# Patient Record
Sex: MALE | Race: WHITE | NOT HISPANIC OR LATINO | Employment: FULL TIME | URBAN - METROPOLITAN AREA
[De-identification: names, ages, dates, MRNs, and addresses within clinical notes are randomized per-mention and may not be internally consistent; named-entity substitution may affect disease eponyms.]

---

## 2017-02-23 ENCOUNTER — ALLSCRIPTS OFFICE VISIT (OUTPATIENT)
Dept: OTHER | Facility: OTHER | Age: 27
End: 2017-02-23

## 2018-01-14 VITALS
DIASTOLIC BLOOD PRESSURE: 72 MMHG | TEMPERATURE: 98.4 F | WEIGHT: 145 LBS | OXYGEN SATURATION: 98 % | SYSTOLIC BLOOD PRESSURE: 112 MMHG | HEIGHT: 70 IN | BODY MASS INDEX: 20.76 KG/M2 | HEART RATE: 72 BPM

## 2018-07-21 PROBLEM — B35.4 TINEA CORPORIS: Status: ACTIVE | Noted: 2017-02-23

## 2018-07-25 ENCOUNTER — OFFICE VISIT (OUTPATIENT)
Dept: INTERNAL MEDICINE CLINIC | Facility: CLINIC | Age: 28
End: 2018-07-25

## 2018-07-25 VITALS
TEMPERATURE: 98.2 F | HEART RATE: 54 BPM | DIASTOLIC BLOOD PRESSURE: 76 MMHG | OXYGEN SATURATION: 98 % | HEIGHT: 68 IN | BODY MASS INDEX: 24.07 KG/M2 | WEIGHT: 158.8 LBS | SYSTOLIC BLOOD PRESSURE: 110 MMHG | RESPIRATION RATE: 16 BRPM

## 2018-07-25 DIAGNOSIS — L72.9 SUBCUTANEOUS CYST: ICD-10-CM

## 2018-07-25 DIAGNOSIS — R21 RASH: ICD-10-CM

## 2018-07-25 DIAGNOSIS — Z00.00 HEALTH MAINTENANCE EXAMINATION: Primary | ICD-10-CM

## 2018-07-25 DIAGNOSIS — G43.109 MIGRAINE WITH AURA AND WITHOUT STATUS MIGRAINOSUS, NOT INTRACTABLE: ICD-10-CM

## 2018-07-25 DIAGNOSIS — J30.1 SEASONAL ALLERGIC RHINITIS DUE TO POLLEN: ICD-10-CM

## 2018-07-25 PROBLEM — B35.4 TINEA CORPORIS: Status: RESOLVED | Noted: 2017-02-23 | Resolved: 2018-07-25

## 2018-07-25 PROCEDURE — 99395 PREV VISIT EST AGE 18-39: CPT | Performed by: INTERNAL MEDICINE

## 2018-07-25 NOTE — ASSESSMENT & PLAN NOTE
Instructed to apply warm moist heat on the area, avoid manipulation      Refer to surgery as requested for removal

## 2018-07-25 NOTE — PROGRESS NOTES
Assessment/Plan:    Subcutaneous cyst  Instructed to apply warm moist heat on the area, avoid manipulation  Refer to surgery as requested for removal     Seasonal allergic rhinitis due to pollen  Mild, takes NSAIDs as needed  Migraine with aura and without status migrainosus, not intractable  Occurs a few times a year only, rarely takes medication for it  Diagnoses and all orders for this visit:    Health maintenance examination  Comments:  Vaccinations updated  Declined routine lab work  Rash  Comments:  Apply hydrocortisone daily for a week  If no improvement, start anti fungal cream bid until rash completely gone  Subcutaneous cyst  -     Ambulatory referral to General Surgery; Future    Migraine with aura and without status migrainosus, not intractable    Seasonal allergic rhinitis due to pollen      Follow up as needed  Subjective:      Patient ID: Ramon Cobian is a 28 y o  male  Behzad complains of a rash on his left shoulder  He first noticed this about 3 months ago, area becomes irritated easily  He denies any bleeding or discharge from it  He picks at it frequently, because it gets irritated did while he is working  Denies any trauma  He also complains of a rash on his right thigh, area is slightly red but not itchy  He has not applied any cream or ointments for the rashes  He experiences seasonal allergies, would take an Advil as needed  He would developed mild sinus headaches  He would also experience migraine headaches a few times a year  He rarely takes medication for this, which is sleep in a dark room  The following portions of the patient's history were reviewed and updated as appropriate: allergies, current medications, past family history, past medical history, past social history, past surgical history and problem list     Review of Systems   Constitutional: Negative for appetite change and fatigue     HENT: Negative for congestion, hearing loss and postnasal drip  Eyes: Negative  Negative for visual disturbance  Respiratory: Negative for cough, chest tightness and shortness of breath  Cardiovascular: Negative for chest pain, palpitations and leg swelling  Gastrointestinal: Negative for abdominal pain and constipation  Genitourinary: Negative for dysuria, frequency and urgency  Musculoskeletal: Negative for arthralgias  Skin: Positive for rash and wound  Neurological: Negative for dizziness, numbness and headaches  Hematological: Does not bruise/bleed easily  Psychiatric/Behavioral: Negative for sleep disturbance  The patient is not nervous/anxious  Objective:      /76   Pulse (!) 54   Temp 98 2 °F (36 8 °C)   Resp 16   Ht 5' 8" (1 727 m)   Wt 72 kg (158 lb 12 8 oz)   SpO2 98%   BMI 24 15 kg/m²          Physical Exam   Constitutional: He is oriented to person, place, and time  He appears well-developed and well-nourished  HENT:   Head: Normocephalic and atraumatic  Right Ear: Tympanic membrane and external ear normal    Left Ear: Tympanic membrane and external ear normal    Nose: Nose normal    Mouth/Throat: Uvula is midline, oropharynx is clear and moist and mucous membranes are normal    Eyes: Conjunctivae are normal  Pupils are equal, round, and reactive to light  Neck: Neck supple  No thyroid mass present  Cardiovascular: Normal rate, regular rhythm and normal heart sounds  No edema  Pulmonary/Chest: Effort normal and breath sounds normal  He has no wheezes  He has no rhonchi  Abdominal: Soft  Bowel sounds are normal  There is no tenderness  No hernia  Musculoskeletal: Normal range of motion  No joint pain or swelling   Lymphadenopathy:     He has no cervical adenopathy  Right: No supraclavicular adenopathy present  Left: No supraclavicular adenopathy present  Neurological: He is alert and oriented to person, place, and time  No cranial nerve deficit  Skin: Skin is warm  Lesion and rash noted  No purpura noted  No erythema  Psychiatric: He has a normal mood and affect  His behavior is normal    Nursing note and vitals reviewed

## 2018-07-25 NOTE — PATIENT INSTRUCTIONS
Apply warm moist heat on left shoulder 5-10 minutes at a time several times a day for the next week  Avoid picking at it  May apply hydrocortisone cream for rash on thigh  If no improvement, start ketoconazole or clotrimazole (anti fungal) cream twice a day until gone

## 2019-04-09 ENCOUNTER — TELEPHONE (OUTPATIENT)
Dept: INTERNAL MEDICINE CLINIC | Facility: CLINIC | Age: 29
End: 2019-04-09

## 2023-12-12 ENCOUNTER — HOSPITAL ENCOUNTER (EMERGENCY)
Facility: HOSPITAL | Age: 33
Discharge: HOME | End: 2023-12-12
Attending: EMERGENCY MEDICINE

## 2023-12-12 VITALS
OXYGEN SATURATION: 98 % | SYSTOLIC BLOOD PRESSURE: 108 MMHG | HEART RATE: 60 BPM | RESPIRATION RATE: 18 BRPM | TEMPERATURE: 98.3 F | DIASTOLIC BLOOD PRESSURE: 60 MMHG

## 2023-12-12 DIAGNOSIS — U07.1 COVID-19: Primary | ICD-10-CM

## 2023-12-12 DIAGNOSIS — R11.2 NAUSEA AND VOMITING: ICD-10-CM

## 2023-12-12 LAB
ALBUMIN SERPL BCP-MCNC: 4.5 G/DL (ref 3.5–5)
ALP SERPL-CCNC: 57 U/L (ref 34–104)
ALT SERPL W P-5'-P-CCNC: 19 U/L (ref 7–52)
ANION GAP SERPL CALCULATED.3IONS-SCNC: 9 MMOL/L
APTT PPP: 28 SECONDS (ref 23–37)
AST SERPL W P-5'-P-CCNC: 20 U/L (ref 13–39)
BASOPHILS # BLD AUTO: 0.05 THOUSANDS/ÂΜL (ref 0–0.1)
BASOPHILS NFR BLD AUTO: 1 % (ref 0–1)
BILIRUB SERPL-MCNC: 0.55 MG/DL (ref 0.2–1)
BUN SERPL-MCNC: 14 MG/DL (ref 5–25)
CALCIUM SERPL-MCNC: 9.3 MG/DL (ref 8.4–10.2)
CHLORIDE SERPL-SCNC: 100 MMOL/L (ref 96–108)
CO2 SERPL-SCNC: 27 MMOL/L (ref 21–32)
CREAT SERPL-MCNC: 1.01 MG/DL (ref 0.6–1.3)
EOSINOPHIL # BLD AUTO: 0 THOUSAND/ÂΜL (ref 0–0.61)
EOSINOPHIL NFR BLD AUTO: 0 % (ref 0–6)
ERYTHROCYTE [DISTWIDTH] IN BLOOD BY AUTOMATED COUNT: 12.3 % (ref 11.6–15.1)
FLUAV RNA RESP QL NAA+PROBE: NEGATIVE
FLUBV RNA RESP QL NAA+PROBE: NEGATIVE
GFR SERPL CREATININE-BSD FRML MDRD: 97 ML/MIN/1.73SQ M
GLUCOSE SERPL-MCNC: 115 MG/DL (ref 65–140)
HCT VFR BLD AUTO: 41.6 % (ref 36.5–49.3)
HGB BLD-MCNC: 14.4 G/DL (ref 12–17)
IMM GRANULOCYTES # BLD AUTO: 0.04 THOUSAND/UL (ref 0–0.2)
IMM GRANULOCYTES NFR BLD AUTO: 1 % (ref 0–2)
INR PPP: 0.99 (ref 0.84–1.19)
LIPASE SERPL-CCNC: <6 U/L (ref 11–82)
LYMPHOCYTES # BLD AUTO: 0.4 THOUSANDS/ÂΜL (ref 0.6–4.47)
LYMPHOCYTES NFR BLD AUTO: 6 % (ref 14–44)
MAGNESIUM SERPL-MCNC: 1.9 MG/DL (ref 1.9–2.7)
MCH RBC QN AUTO: 31.2 PG (ref 26.8–34.3)
MCHC RBC AUTO-ENTMCNC: 34.6 G/DL (ref 31.4–37.4)
MCV RBC AUTO: 90 FL (ref 82–98)
MONOCYTES # BLD AUTO: 0.76 THOUSAND/ÂΜL (ref 0.17–1.22)
MONOCYTES NFR BLD AUTO: 12 % (ref 4–12)
NEUTROPHILS # BLD AUTO: 5.18 THOUSANDS/ÂΜL (ref 1.85–7.62)
NEUTS SEG NFR BLD AUTO: 80 % (ref 43–75)
NRBC BLD AUTO-RTO: 0 /100 WBCS
PLATELET # BLD AUTO: 247 THOUSANDS/UL (ref 149–390)
PMV BLD AUTO: 10.8 FL (ref 8.9–12.7)
POTASSIUM SERPL-SCNC: 3.3 MMOL/L (ref 3.5–5.3)
PROT SERPL-MCNC: 7.2 G/DL (ref 6.4–8.4)
PROTHROMBIN TIME: 13.7 SECONDS (ref 11.6–14.5)
RBC # BLD AUTO: 4.62 MILLION/UL (ref 3.88–5.62)
RSV RNA RESP QL NAA+PROBE: NEGATIVE
SARS-COV-2 RNA RESP QL NAA+PROBE: POSITIVE
SODIUM SERPL-SCNC: 136 MMOL/L (ref 135–147)
WBC # BLD AUTO: 6.43 THOUSAND/UL (ref 4.31–10.16)

## 2023-12-12 PROCEDURE — C9113 INJ PANTOPRAZOLE SODIUM, VIA: HCPCS

## 2023-12-12 PROCEDURE — 0241U HB NFCT DS VIR RESP RNA 4 TRGT: CPT

## 2023-12-12 PROCEDURE — 99283 EMERGENCY DEPT VISIT LOW MDM: CPT

## 2023-12-12 PROCEDURE — 96365 THER/PROPH/DIAG IV INF INIT: CPT

## 2023-12-12 PROCEDURE — 80053 COMPREHEN METABOLIC PANEL: CPT

## 2023-12-12 PROCEDURE — 96375 TX/PRO/DX INJ NEW DRUG ADDON: CPT

## 2023-12-12 PROCEDURE — 85730 THROMBOPLASTIN TIME PARTIAL: CPT

## 2023-12-12 PROCEDURE — 85610 PROTHROMBIN TIME: CPT

## 2023-12-12 PROCEDURE — 36415 COLL VENOUS BLD VENIPUNCTURE: CPT

## 2023-12-12 PROCEDURE — 85025 COMPLETE CBC W/AUTO DIFF WBC: CPT

## 2023-12-12 PROCEDURE — 99284 EMERGENCY DEPT VISIT MOD MDM: CPT

## 2023-12-12 PROCEDURE — 83735 ASSAY OF MAGNESIUM: CPT

## 2023-12-12 PROCEDURE — 96361 HYDRATE IV INFUSION ADD-ON: CPT

## 2023-12-12 PROCEDURE — 83690 ASSAY OF LIPASE: CPT

## 2023-12-12 RX ORDER — SUCRALFATE 1 G/1
1 TABLET ORAL ONCE
Status: COMPLETED | OUTPATIENT
Start: 2023-12-12 | End: 2023-12-12

## 2023-12-12 RX ORDER — PANTOPRAZOLE SODIUM 40 MG/10ML
40 INJECTION, POWDER, LYOPHILIZED, FOR SOLUTION INTRAVENOUS ONCE
Status: COMPLETED | OUTPATIENT
Start: 2023-12-12 | End: 2023-12-12

## 2023-12-12 RX ORDER — ONDANSETRON 4 MG/1
4 TABLET, ORALLY DISINTEGRATING ORAL EVERY 6 HOURS PRN
Qty: 20 TABLET | Refills: 0 | Status: SHIPPED | OUTPATIENT
Start: 2023-12-12 | End: 2023-12-17

## 2023-12-12 RX ORDER — ACETAMINOPHEN 10 MG/ML
1000 INJECTION, SOLUTION INTRAVENOUS ONCE
Status: COMPLETED | OUTPATIENT
Start: 2023-12-12 | End: 2023-12-12

## 2023-12-12 RX ORDER — SUCRALFATE 1 G/1
1 TABLET ORAL 4 TIMES DAILY
Qty: 20 TABLET | Refills: 0 | Status: SHIPPED | OUTPATIENT
Start: 2023-12-12 | End: 2023-12-17

## 2023-12-12 RX ORDER — ONDANSETRON 2 MG/ML
4 INJECTION INTRAMUSCULAR; INTRAVENOUS ONCE
Status: COMPLETED | OUTPATIENT
Start: 2023-12-12 | End: 2023-12-12

## 2023-12-12 RX ORDER — FAMOTIDINE 20 MG/1
20 TABLET, FILM COATED ORAL 2 TIMES DAILY
Qty: 14 TABLET | Refills: 0 | Status: SHIPPED | OUTPATIENT
Start: 2023-12-12 | End: 2023-12-19

## 2023-12-12 RX ADMIN — ACETAMINOPHEN 1000 MG: 10 INJECTION INTRAVENOUS at 01:44

## 2023-12-12 RX ADMIN — PANTOPRAZOLE SODIUM 40 MG: 40 INJECTION, POWDER, FOR SOLUTION INTRAVENOUS at 01:44

## 2023-12-12 RX ADMIN — ONDANSETRON 4 MG: 2 INJECTION INTRAMUSCULAR; INTRAVENOUS at 01:44

## 2023-12-12 RX ADMIN — SODIUM CHLORIDE 1000 ML: 0.9 INJECTION, SOLUTION INTRAVENOUS at 01:47

## 2023-12-12 RX ADMIN — SUCRALFATE 1 G: 1 TABLET ORAL at 03:15

## 2023-12-12 NOTE — ED PROVIDER NOTES
"History  Chief Complaint   Patient presents with    Vomiting     Pt states he has been having episodes of  vomiting with \"bright red spots\" since 5pm +Abdominal pressure that started an hr ago. +Loss of appetite, +runny nose +body aches +dizziness, +hedacahe Denies diarrheas and urinating issues       The patient is a 32-year-old male with no significant PMH presenting for evaluation of 1 day of bodyaches, low-grade fevers, chills, nausea, and vomiting.  The patient notes starting yesterday evening with generalized bodyaches and feeling unwell.  This morning he noted low-grade fevers with Tmax of 100.4 Fahrenheit, chills, and nausea.  He started vomiting this morning and reports vomiting a total of 7 times today.  This afternoon, he did note the vomitus looked more like bile with black specks and this evening with his last 2 vomiting episodes he noted bright red blood that color the toilet bowl. He endorses associated low-grade frontal headaches.  He has been taking Tylenol with some relief of his headache and fever/chills with last dose at 6 PM.  He denies associated rhinorrhea, nasal congestion, sore throat, cough, CP/SOB, palpitations, abdominal pain, diarrhea, and urinary complaints.  He notes last bowel movement earlier today and soft brown.  He denies prior abdominal surgeries.  He denies EtOH use.  He notes infrequently using marijuana.  He denies all other recreational drugs.  His wife is at bedside and helps provide information for HPI.  She notes that their daughter was sick over the weekend (2 days PTA) and she also herself was sick last week with mild nausea and 1 episode of vomiting.      History provided by:  Patient and significant other   used: No        None       History reviewed. No pertinent past medical history.    Past Surgical History:   Procedure Laterality Date    HERNIA REPAIR      groin        Family History   Problem Relation Age of Onset    Hyperlipidemia Mother     " Kidney disease Mother     Diabetes Maternal Grandmother     Skin cancer Maternal Grandfather     Cancer Maternal Grandfather     Alcohol abuse Neg Hx     Substance Abuse Neg Hx     Mental illness Neg Hx     Depression Neg Hx      I have reviewed and agree with the history as documented.    E-Cigarette/Vaping     E-Cigarette/Vaping Substances     Social History     Tobacco Use    Smoking status: Never    Smokeless tobacco: Never   Substance Use Topics    Alcohol use: No    Drug use: Yes     Types: Marijuana     Comment: daily use       Review of Systems   Constitutional:  Positive for appetite change (Decreased), chills, fatigue and fever.   HENT:  Negative for congestion, rhinorrhea, sneezing, sore throat and trouble swallowing.    Respiratory:  Negative for cough and shortness of breath.    Cardiovascular:  Negative for chest pain.   Gastrointestinal:  Positive for nausea and vomiting. Negative for abdominal pain, blood in stool, constipation and diarrhea.   Genitourinary: Negative.    Musculoskeletal:  Negative for back pain, gait problem, neck pain and neck stiffness.   Skin:  Negative for color change, pallor, rash and wound.   Allergic/Immunologic: Negative for immunocompromised state.   Neurological:  Positive for weakness (Generalized) and headaches. Negative for dizziness, syncope, light-headedness and numbness.   All other systems reviewed and are negative.      Physical Exam  Physical Exam  Vitals and nursing note reviewed.   Constitutional:       General: He is not in acute distress.     Appearance: Normal appearance. He is well-developed and normal weight. He is ill-appearing (Appears fatigued and mildly ill). He is not toxic-appearing or diaphoretic.   HENT:      Head: Normocephalic and atraumatic.      Jaw: There is normal jaw occlusion.      Nose: Nose normal.      Mouth/Throat:      Lips: Pink.      Mouth: Mucous membranes are moist.      Pharynx: Oropharynx is clear. Uvula midline.   Eyes:       Extraocular Movements: Extraocular movements intact.      Conjunctiva/sclera: Conjunctivae normal.   Cardiovascular:      Rate and Rhythm: Normal rate and regular rhythm.      Pulses: Normal pulses.           Radial pulses are 2+ on the right side and 2+ on the left side.        Dorsalis pedis pulses are 2+ on the right side and 2+ on the left side.      Heart sounds: Normal heart sounds, S1 normal and S2 normal. No murmur heard.  Pulmonary:      Effort: Pulmonary effort is normal. No tachypnea or respiratory distress.      Breath sounds: Normal breath sounds and air entry. No stridor, decreased air movement or transmitted upper airway sounds. No decreased breath sounds.   Abdominal:      General: Bowel sounds are normal. There is no distension.      Palpations: Abdomen is soft.      Tenderness: There is no abdominal tenderness. There is no guarding or rebound. Negative signs include Renner's sign, Rovsing's sign and McBurney's sign.   Musculoskeletal:         General: Normal range of motion.      Cervical back: Neck supple.      Right lower leg: No edema.      Left lower leg: No edema.   Skin:     General: Skin is warm and dry.      Capillary Refill: Capillary refill takes 2 to 3 seconds.      Findings: No rash or wound.   Neurological:      General: No focal deficit present.      Mental Status: He is alert and oriented to person, place, and time. Mental status is at baseline.      GCS: GCS eye subscore is 4. GCS verbal subscore is 5. GCS motor subscore is 6.         Vital Signs  ED Triage Vitals   Temperature Pulse Respirations Blood Pressure SpO2   12/12/23 0100 12/12/23 0100 12/12/23 0103 12/12/23 0103 12/12/23 0103   98.3 °F (36.8 °C) 68 18 115/69 97 %      Temp Source Heart Rate Source Patient Position - Orthostatic VS BP Location FiO2 (%)   12/12/23 0100 12/12/23 0100 12/12/23 0103 12/12/23 0103 --   Oral Monitor Lying Right arm       Pain Score       --                  Vitals:    12/12/23 0100 12/12/23 0103  12/12/23 0318   BP:  115/69 108/60   Pulse: 68  60   Patient Position - Orthostatic VS:  Lying          Visual Acuity      ED Medications  Medications   sodium chloride 0.9 % bolus 1,000 mL (0 mL Intravenous Stopped 12/12/23 0356)   ondansetron (ZOFRAN) injection 4 mg (4 mg Intravenous Given 12/12/23 0144)   acetaminophen (Ofirmev) injection 1,000 mg (0 mg Intravenous Stopped 12/12/23 0252)   pantoprazole (PROTONIX) injection 40 mg (40 mg Intravenous Given 12/12/23 0144)   sucralfate (CARAFATE) tablet 1 g (1 g Oral Given 12/12/23 0315)       Diagnostic Studies  Results Reviewed       Procedure Component Value Units Date/Time    FLU/RSV/COVID - if FLU/RSV clinically relevant [[id number]]  (Abnormal) Collected: 12/12/23 0146    Lab Status: Final result Specimen: Nares from Nose Updated: 12/12/23 0233     SARS-CoV-2 Positive     INFLUENZA A PCR Negative     INFLUENZA B PCR Negative     RSV PCR Negative    Narrative:      FOR PEDIATRIC PATIENTS - copy/paste COVID Guidelines URL to browser: [email or web address]    SARS-CoV-2 assay is a Nucleic Acid Amplification assay intended for the  qualitative detection of nucleic acid from SARS-CoV-2 in nasopharyngeal  swabs. Results are for the presumptive identification of SARS-CoV-2 RNA.    Positive results are indicative of infection with SARS-CoV-2, the virus  causing COVID-19, but do not rule out bacterial infection or co-infection  with other viruses. Laboratories within the [place] and its  territories are required to report all positive results to the appropriate  public health authorities. Negative results do not preclude SARS-CoV-2  infection and should not be used as the sole basis for treatment or other  patient management decisions. Negative results must be combined with  clinical observations, patient history, and epidemiological information.  This test has not been FDA cleared or approved.    This test has  been authorized by FDA under an Emergency Use Authorization  (EUA). This test is only authorized for the duration of time the  declaration that circumstances exist justifying the authorization of the  emergency use of an in vitro diagnostic tests for detection of SARS-CoV-2  virus and/or diagnosis of COVID-19 infection under section 564(b)(1) of  the Act, 21 U.S.C. 360bbb-3(b)(1), unless the authorization is terminated  or revoked sooner. The test has been validated but independent review by FDA  and CLIA is pending.    Test performed using [employer]: This RT-PCR assay targets N2,  a region unique to SARS-CoV-2. A conserved region in the E-gene was chosen  for pan-Sarbecovirus detection which includes SARS-CoV-2.    According to CMS-2020-01-R, this platform meets the definition of high-throughput technology.    Lipase [[id number]]  (Abnormal) Collected: 12/12/23 0146    Lab Status: Final result Specimen: Blood from Arm, Right Updated: 12/12/23 0217     Lipase <6 u/L     Comprehensive metabolic panel [[id number]]  (Abnormal) Collected: 12/12/23 0146    Lab Status: Final result Specimen: Blood from Arm, Right Updated: 12/12/23 0217     Sodium 136 mmol/L      Potassium 3.3 mmol/L      Chloride 100 mmol/L      CO2 27 mmol/L      ANION GAP 9 mmol/L      BUN 14 mg/dL      Creatinine 1.01 mg/dL      Glucose 115 mg/dL      Calcium 9.3 mg/dL      AST 20 U/L      ALT 19 U/L      Alkaline Phosphatase 57 U/L      Total Protein 7.2 g/dL      Albumin 4.5 g/dL      Total Bilirubin 0.55 mg/dL      eGFR 97 ml/min/1.73sq m     Narrative:      National Kidney Disease Foundation guidelines for Chronic Kidney Disease (CKD):     Stage 1 with normal or high GFR (GFR > 90 mL/min/1.73 square meters)    Stage 2 Mild CKD (GFR = 60-89 mL/min/1.73 square meters)    Stage 3A Moderate CKD (GFR = 45-59 mL/min/1.73 square meters)    Stage 3B Moderate CKD (GFR = 30-44 mL/min/1.73 square meters)    Stage 4 Severe CKD (GFR = 15-29 mL/min/1.73  square meters)    Stage 5 End Stage CKD (GFR <15 mL/min/1.73 square meters)  Note: GFR calculation is accurate only with a steady state creatinine    Magnesium [[id number]]  (Normal) Collected: 12/12/23 0146    Lab Status: Final result Specimen: Blood from Arm, Right Updated: 12/12/23 0217     Magnesium 1.9 mg/dL     Protime-INR [[id number]]  (Normal) Collected: 12/12/23 0146    Lab Status: Final result Specimen: Blood from Arm, Right Updated: 12/12/23 0208     Protime 13.7 seconds      INR 0.99    APTT [[id number]]  (Normal) Collected: 12/12/23 0146    Lab Status: Final result Specimen: Blood from Arm, Right Updated: 12/12/23 0208     PTT 28 seconds     CBC and differential [[id number]]  (Abnormal) Collected: 12/12/23 0146    Lab Status: Final result Specimen: Blood from Arm, Right Updated: 12/12/23 0154     WBC 6.43 Thousand/uL      RBC 4.62 Million/uL      Hemoglobin 14.4 g/dL      Hematocrit 41.6 %      MCV 90 fL      MCH 31.2 pg      MCHC 34.6 g/dL      RDW 12.3 %      MPV 10.8 fL      Platelets 247 Thousands/uL      nRBC 0 /100 WBCs      Neutrophils Relative 80 %      Immat GRANS % 1 %      Lymphocytes Relative 6 %      Monocytes Relative 12 %      Eosinophils Relative 0 %      Basophils Relative 1 %      Neutrophils Absolute 5.18 Thousands/µL      Immature Grans Absolute 0.04 Thousand/uL      Lymphocytes Absolute 0.40 Thousands/µL      Monocytes Absolute 0.76 Thousand/µL      Eosinophils Absolute 0.00 Thousand/µL      Basophils Absolute 0.05 Thousands/µL                    No orders to display              Procedures  Procedures         ED Course  ED Course as of 12/12/23 0420   Tue Dec 12, 2023   0110 Triage vital signs within normal limits and stable   0120 Patient appears fatigued and mildly ill, physical exam otherwise benign without acute abnormality.  Plan to obtain electrolytes to rule out derangement given significant vomiting, viral swab to evaluate for COVID/flu, and basic abdominal labs.  Given benign  abdominal exam, no indication for CT imaging at this time.   0204 CBC and differential(!)  No leukocytosis or gross anemia   0210 Coags stable   0223 Comprehensive metabolic panel(!)  Mild hypokalemia, will replete, no IRIS, normal random glucose, no transaminitis   0223 Magnesium: 1.9  WNL   0223 Lipase(!): <6  No abdominal pain, no history of EtOH use, no history of hyperlipidemia, acute pancreatitis less likely   0233 SARS-COV-2(!): Positive  Likely source of pts sx   0250 Patient and his wife at bedside updated on results.  He is sleeping comfortably.  Suspicion highest for viral syndrome as source of patient's symptoms at this time. plan made for p.o. challenge.   0347 Patient tolerating Gatorade and few crackers.  Overall remains with stable vital signs and benign abdominal exam.  Plan made for discharge to home with supportive care.  Discussed Paxlovid, however patient defers at this time.                                             Medical Decision Making  DDx including but not limited to: Viral illness, food poisoning, metabolic normality, dehydration, gastritis; consider but doubt acute infectious or surgical intra-abdominal pathology    See ED course for further MDM and disposition discussion.        Problems Addressed:  COVID-19: acute illness or injury  Nausea and vomiting: acute illness or injury    Amount and/or Complexity of Data Reviewed  Independent Historian: spouse  Labs: ordered. Decision-making details documented in ED Course.    Risk  OTC drugs.  Prescription drug management.             Disposition  Final diagnoses:   COVID-19   Nausea and vomiting     Time reflects when diagnosis was documented in both MDM as applicable and the Disposition within this note       Time User Action Codes Description Comment    12/12/2023  3:47 AM Jaclyn Petty Add [U07.1] COVID-19     12/12/2023  3:47 AM Jaclyn Petty Add [R11.2] Nausea and vomiting           ED Disposition       ED Disposition   Discharge     Condition   Stable    Date/Time   Tue Dec 12, 2023  3:47 AM    Comment   Behzad Jose M discharge to home/self care.                   Follow-up Information       Follow up With Specialties Details Why Contact Info Additional Information    Aimee Albrecht MD Internal Medicine Schedule an appointment as soon as possible for a visit in 1 week  [address]  [address]  [facility] [address]  [phone]       [facility] Emergency Department Emergency Medicine Go to  If symptoms worsen [address] [address]  [phone] [facility] Emergency Department, [address], [place], [address]            Discharge Medication List as of 12/12/2023  3:49 AM        START taking these medications    Details   famotidine (PEPCID) 20 mg tablet Take 1 tablet (20 mg total) by mouth 2 (two) times a day for 7 days, Starting Tue 12/12/2023, Until Tue 12/19/2023, Normal      ondansetron (ZOFRAN-ODT) 4 mg disintegrating tablet Take 1 tablet (4 mg total) by mouth every 6 (six) hours as needed for nausea or vomiting for up to 5 days, Starting Tue 12/12/2023, Until Sun 12/17/2023 at 2359, Normal      sucralfate (CARAFATE) 1 g tablet Take 1 tablet (1 g total) by mouth 4 (four) times a day for 5 days, Starting Tue 12/12/2023, Until Sun 12/17/2023, Normal             No discharge procedures on file.    PDMP Review         Value Time User    PDMP Reviewed  Yes 12/12/2023  1:05 AM Tera Herndon MD            ED Provider  Electronically Signed by             JEANA Hollis  12/12/23 1738

## 2023-12-12 NOTE — DISCHARGE INSTRUCTIONS
Take the prescribed medications as directed.  Increase your fluid intake by taking small sips throughout the day.  Return to the ER if develop persistent fever, new or worsening vomiting, difficulty breathing, weakness, confusion, or lethargy.